# Patient Record
Sex: MALE | Race: ASIAN | NOT HISPANIC OR LATINO | Employment: FULL TIME | URBAN - METROPOLITAN AREA
[De-identification: names, ages, dates, MRNs, and addresses within clinical notes are randomized per-mention and may not be internally consistent; named-entity substitution may affect disease eponyms.]

---

## 2019-12-20 ENCOUNTER — OFFICE VISIT - HEALTHEAST (OUTPATIENT)
Dept: FAMILY MEDICINE | Facility: CLINIC | Age: 34
End: 2019-12-20

## 2019-12-20 DIAGNOSIS — Z11.1 SCREENING EXAMINATION FOR PULMONARY TUBERCULOSIS: ICD-10-CM

## 2019-12-20 ASSESSMENT — MIFFLIN-ST. JEOR: SCORE: 1286.29

## 2019-12-26 LAB
GAMMA INTERFERON BACKGROUND BLD IA-ACNC: 0.08 IU/ML
M TB IFN-G BLD-IMP: NEGATIVE
MITOGEN IGNF BCKGRD COR BLD-ACNC: 0.03 IU/ML
MITOGEN IGNF BCKGRD COR BLD-ACNC: 0.03 IU/ML
QTF INTERPRETATION: NORMAL
QTF MITOGEN - NIL: 6.87 IU/ML

## 2019-12-30 ENCOUNTER — COMMUNICATION - HEALTHEAST (OUTPATIENT)
Dept: FAMILY MEDICINE | Facility: CLINIC | Age: 34
End: 2019-12-30

## 2021-06-04 VITALS
DIASTOLIC BLOOD PRESSURE: 76 MMHG | RESPIRATION RATE: 20 BRPM | HEART RATE: 74 BPM | SYSTOLIC BLOOD PRESSURE: 121 MMHG | WEIGHT: 109 LBS | BODY MASS INDEX: 20.58 KG/M2 | HEIGHT: 61 IN

## 2021-06-04 NOTE — TELEPHONE ENCOUNTER
Called and spoke with patient.     Per patient order he is stated that you are going to sent him a letter regarding the TB result.    Please advise?
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
## 2021-06-04 NOTE — TELEPHONE ENCOUNTER
----- Message from Negrita Morales PA-C sent at 12/29/2019  4:54 PM CST -----  Call:   TB test is negative

## 2021-06-04 NOTE — TELEPHONE ENCOUNTER
Use  line to call pt and left a message for pt to return call. Okay to relay message.   Attempted #2   name: [facility]      ID: [id number]

## 2022-09-27 ENCOUNTER — HOSPITAL ENCOUNTER (EMERGENCY)
Facility: HOSPITAL | Age: 37
Discharge: HOME | End: 2022-09-27
Admitting: PHYSICIAN ASSISTANT
Payer: COMMERCIAL

## 2022-09-27 ENCOUNTER — APPOINTMENT (OUTPATIENT)
Dept: RADIOLOGY | Facility: HOSPITAL | Age: 37
End: 2022-09-27
Attending: EMERGENCY MEDICINE
Payer: COMMERCIAL

## 2022-09-27 VITALS
TEMPERATURE: 98.2 F | SYSTOLIC BLOOD PRESSURE: 130 MMHG | RESPIRATION RATE: 18 BRPM | OXYGEN SATURATION: 98 % | HEIGHT: 61 IN | HEART RATE: 76 BPM | WEIGHT: 109 LBS | DIASTOLIC BLOOD PRESSURE: 86 MMHG | BODY MASS INDEX: 20.58 KG/M2

## 2022-09-27 DIAGNOSIS — S20.219A RIB CONTUSION: ICD-10-CM

## 2022-09-27 PROCEDURE — 99283 EMERGENCY DEPT VISIT LOW MDM: CPT

## 2022-09-27 PROCEDURE — 71101 X-RAY EXAM UNILAT RIBS/CHEST: CPT | Mod: LT

## 2022-09-27 RX ORDER — IBUPROFEN 600 MG/1
600 TABLET, FILM COATED ORAL ONCE
Status: DISCONTINUED | OUTPATIENT
Start: 2022-09-27 | End: 2022-09-27 | Stop reason: HOSPADM

## 2022-09-27 NOTE — Clinical Note
Marcelo Willams was seen and treated in our emergency department on 9/27/2022.         Sincerely,     [facility] Emergency Department

## 2022-09-27 NOTE — Clinical Note
Imer was seen and treated in our emergency department on 9/27/2022.  He may return to school on 09/27/2022.  Please excuse Pita from school as she needed to be present with family.    If you have any questions or concerns, please don't hesitate to call.      Bianca Richard PA-C

## 2022-09-27 NOTE — DISCHARGE INSTRUCTIONS
Your x-rays are negative for any fractures.  Your symptoms are likely due to a bruise or contusion.  You can take Tylenol or ibuprofen as needed for pain.  Would recommend following up with your primary care doctor in another week especially if symptoms are not improving.  If you have increased pain, difficulty breathing, new pain, return to the ER.

## 2022-09-27 NOTE — Clinical Note
Marcelo Willams was seen and treated in our emergency department on 9/27/2022.         Sincerely,     [facility] Emergency Department

## 2022-09-27 NOTE — ED PROVIDER NOTES
ED PROVIDER NOTE    EMERGENCY DEPARTMENT ENCOUNTER      NAME: Marcelo Willams  AGE: 37 year old male  YOB: 1985  MRN: [id number]  EVALUATION DATE & TIME: No admission date for patient encounter.    PCP: Ryan Brothers    ED PROVIDER: Bianca Rcihard PA-C      Chief Complaint   Patient presents with     Rib Pain         FINAL IMPRESSION:  1. Rib contusion          MEDICAL DECISION MAKING:    Pertinent Labs & Imaging studies reviewed. (See chart for details)    37 year old male who is otherwise healthy presents to the Emergency Department for evaluation of rib pain for the last week after getting hit in the left ribs with a soccer ball.    Here vitals are stable and he is afebrile.  Clinically looks well, nontoxic walking without difficulty.  Has point tenderness over the left anterior mid rib.  No bruising or swelling.  There is no abdominal tenderness.  Low suspicion for any intra-abdominal pathology.    Presentation most consistent with chest wall/rib contusion, also considered rib fracture.  X-rays obtained and negative for fracture or other acute findings.  Presentation most consistent with contusion.    Discussed symptomatic management at home, follow-up with PCP in about a week if symptoms or not improving and discussed signs and symptoms that should prompt return to the ER in the meantime.    At the conclusion of the encounter I discussed the results of all of the tests and the disposition. The questions were answered. The patient or family acknowledged understanding and was agreeable with the care plan.         ED COURSE  8:50 AM Met and evaluated patient. Discussed ED plan. Discussed CXR and dispo      MEDICATIONS GIVEN IN THE EMERGENCY:  Medications   ibuprofen (ADVIL/MOTRIN) tablet 600 mg (has no administration in time range)       NEW PRESCRIPTIONS STARTED AT TODAY'S ER VISIT  New Prescriptions    No medications on file           =================================================================    HPI    Patient information was obtained from: [employer]    Use of Intrepreter: Yes (In Person) Language: Kandice Willams is a 37 year old male who presents to the ED by walk in for evaluation of rib pain.    Patient reports that on 9/20/22 he was hit on his left-side ribs by a soccer ball. Patient reports that that his pain has not gotten better and he is having difficulties breathing because his pain is worse with inspiration. Patient denies vomiting, diarrhea, dysuria, other urinary symptoms, or any other concerns at this time.    REVIEW OF SYSTEMS   Constitutional: Negative for fevers, chills.  Vision: Negative for vision changes  HENT:  Negative for congestion, sore throat  Pulmonary: Positive for difficulties breathing due to pain.  Cardiac: + chest pain - left rib  GI:Negative for nausea, vomiting, diarrhea, abdominal pain  : Negative for urinary symptoms  Musculoskeletal: Positive for left rib pain. Negative for extremity pain  Neuro: Negative for weakness, numbness    All other systems reviewed and are negative    PAST MEDICAL HISTORY:  Denies    PAST SURGICAL HISTORY:  No past surgical history on file.        CURRENT MEDICATIONS:      Current Facility-Administered Medications:      ibuprofen (ADVIL/MOTRIN) tablet 600 mg, 600 mg, Oral, Once, Monique Cardoso,     Current Outpatient Medications:      calcium, as carbonate, (TUMS) 200 mg calcium (500 mg) chewable tablet, [CALCIUM, AS CARBONATE, (TUMS) 200 MG CALCIUM (500 MG) CHEWABLE TABLET] Chew 1 tablet daily., Disp: , Rfl:      omeprazole (PRILOSEC) 20 MG capsule, [OMEPRAZOLE (PRILOSEC) 20 MG CAPSULE] 1 po bid for 2 weeks then 1 po qd, Disp: 45 capsule, Rfl: 1    ALLERGIES:  No Known Allergies    FAMILY HISTORY:  No family history on file.    SOCIAL HISTORY:   Smoking: Former smoker  Alcohol: Never  Illicit drug: Never    VITALS:  Vitals:    09/27/22 0708   BP: 130/86   Pulse: 76  "  Resp: 18   Temp: 98.2  F (36.8  C)   TempSrc: Oral   SpO2: 98%   Weight: 49.4 kg (109 lb)   Height: 1.549 m (5' 1\")       PHYSICAL EXAM    General Appearance:  Alert, cooperative, no distress, appears stated age  HENT: Normocephalic without obvious deformity, atraumatic. Mucous membranes moist   Eyes: Conjunctiva clear, Lids normal. No discharge.   Respiratory: No distress. Lungs clear to ausculation bilaterally. No wheezes, rhonchi or stridor  Cardiovascular: Regular rate and rhythm, no murmur.   GI: Abdomen soft, nontendes  : No CVA tenderness  Musculoskeletal: Moving all extremities. No gross deformities.  Tenderness over left anterior mid rib.  No bruising or swelling.  Integument: Warm, dry, no rashes or lesions  Neurologic: Alert and orientated x3. No focal deficits.  Psych: Normal mood and affect        LAB:  Labs Ordered and Resulted from Time of ED Arrival to Time of ED Departure - No data to display    RADIOLOGY:  Ribs XR, unilat 3 views + PA chest,  left   Final Result   IMPRESSION: Trace left pleural effusion. No pneumothorax. The cardiac silhouette and pulmonary vasculature are normal. No rib fractures.            I, Vivek Melvin, am serving as a scribe to document services personally performed by Bianca Richard PA-C based on my observation and the provider's statements to me. IBianca PA-C attest that Vivek Melvin is acting in a scribe capacity, has observed my performance of the services and has documented them in accordance with my direction.    Bianca Richard PA-C   Emergency Medicine       Bianca Richard PA-C  09/27/22 0929    "